# Patient Record
Sex: MALE | Race: BLACK OR AFRICAN AMERICAN
[De-identification: names, ages, dates, MRNs, and addresses within clinical notes are randomized per-mention and may not be internally consistent; named-entity substitution may affect disease eponyms.]

---

## 2018-10-29 ENCOUNTER — HOSPITAL ENCOUNTER (OUTPATIENT)
Dept: HOSPITAL 47 - RADUSWWP | Age: 33
End: 2018-10-29
Attending: FAMILY MEDICINE
Payer: MEDICARE

## 2018-10-29 DIAGNOSIS — M47.814: Primary | ICD-10-CM

## 2018-10-29 DIAGNOSIS — K76.0: ICD-10-CM

## 2018-10-29 DIAGNOSIS — M43.8X4: ICD-10-CM

## 2018-10-29 PROCEDURE — 76700 US EXAM ABDOM COMPLETE: CPT

## 2018-10-29 PROCEDURE — 72070 X-RAY EXAM THORAC SPINE 2VWS: CPT

## 2018-10-29 NOTE — XR
Thoracic spine

 

HISTORY: Pain

 

3 views of the thoracic spine

 

There is a mild spinal curvature. Thoracic vertebral bodies show preserved height and bone mineraliza
tion. Disc spaces are maintained. There is multilevel spondylosis.

 

IMPRESSION: Mild spinal curvature. Thoracic spondylosis.

## 2018-10-29 NOTE — XR
Chest x-ray with left RIBS

 

HISTORY: Chronic pain

 

Frontal view of the chest and 4 views of the left ribs submitted on a total 5 images and correlated p
rior chest x-ray 11/16/2015

 

Chest x-ray is stable. No acute cardiopulmonary disease. Bone mineralization is maintained. No eviden
t displaced rib fracture.

 

IMPRESSION: No significant abnormalities evident, bone scan may be of benefit as indicated
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
## 2022-04-12 ENCOUNTER — HOSPITAL ENCOUNTER (EMERGENCY)
Dept: HOSPITAL 47 - EC | Age: 37
Discharge: HOME | End: 2022-04-12
Payer: COMMERCIAL

## 2022-04-12 VITALS
RESPIRATION RATE: 18 BRPM | SYSTOLIC BLOOD PRESSURE: 134 MMHG | DIASTOLIC BLOOD PRESSURE: 70 MMHG | HEART RATE: 57 BPM | TEMPERATURE: 98 F

## 2022-04-12 DIAGNOSIS — M10.9: Primary | ICD-10-CM

## 2022-04-12 DIAGNOSIS — Z79.899: ICD-10-CM

## 2022-04-12 DIAGNOSIS — F17.200: ICD-10-CM

## 2022-04-12 DIAGNOSIS — I10: ICD-10-CM

## 2022-04-12 DIAGNOSIS — F12.90: ICD-10-CM

## 2022-04-12 DIAGNOSIS — K21.9: ICD-10-CM

## 2022-04-12 PROCEDURE — 99283 EMERGENCY DEPT VISIT LOW MDM: CPT
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
## 2023-07-29 ENCOUNTER — HOSPITAL ENCOUNTER (EMERGENCY)
Dept: HOSPITAL 47 - EC | Age: 38
Discharge: HOME | End: 2023-07-29
Payer: COMMERCIAL

## 2023-07-29 VITALS — TEMPERATURE: 98.6 F | HEART RATE: 56 BPM | SYSTOLIC BLOOD PRESSURE: 163 MMHG | DIASTOLIC BLOOD PRESSURE: 98 MMHG

## 2023-07-29 VITALS — RESPIRATION RATE: 18 BRPM

## 2023-07-29 DIAGNOSIS — K21.9: ICD-10-CM

## 2023-07-29 DIAGNOSIS — F17.200: ICD-10-CM

## 2023-07-29 DIAGNOSIS — I10: ICD-10-CM

## 2023-07-29 DIAGNOSIS — S02.601A: Primary | ICD-10-CM

## 2023-07-29 DIAGNOSIS — F12.90: ICD-10-CM

## 2023-07-29 DIAGNOSIS — Z79.899: ICD-10-CM

## 2023-07-29 DIAGNOSIS — S02.602A: ICD-10-CM
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
## 2023-07-29 PROCEDURE — 99406 BEHAV CHNG SMOKING 3-10 MIN: CPT

## 2023-07-29 PROCEDURE — 99284 EMERGENCY DEPT VISIT MOD MDM: CPT

## 2023-07-29 RX ADMIN — HYDROMORPHONE HYDROCHLORIDE STA: 1 INJECTION, SOLUTION INTRAMUSCULAR; INTRAVENOUS; SUBCUTANEOUS at 19:35

## 2023-07-29 RX ADMIN — HYDROMORPHONE HYDROCHLORIDE STA MG: 1 INJECTION, SOLUTION INTRAMUSCULAR; INTRAVENOUS; SUBCUTANEOUS at 19:35

## 2023-07-29 RX ADMIN — KETOROLAC TROMETHAMINE STA: 15 INJECTION, SOLUTION INTRAMUSCULAR; INTRAVENOUS at 19:45

## 2023-07-29 RX ADMIN — KETOROLAC TROMETHAMINE STA MG: 15 INJECTION, SOLUTION INTRAMUSCULAR; INTRAVENOUS at 19:34
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
## 2023-10-03 ENCOUNTER — HOSPITAL ENCOUNTER (EMERGENCY)
Dept: HOSPITAL 47 - EC | Age: 38
Discharge: HOME | End: 2023-10-03
Payer: COMMERCIAL

## 2023-10-03 VITALS
HEART RATE: 61 BPM | SYSTOLIC BLOOD PRESSURE: 140 MMHG | DIASTOLIC BLOOD PRESSURE: 88 MMHG | RESPIRATION RATE: 18 BRPM | TEMPERATURE: 98 F

## 2023-10-03 DIAGNOSIS — I10: ICD-10-CM

## 2023-10-03 DIAGNOSIS — F17.200: ICD-10-CM

## 2023-10-03 DIAGNOSIS — M10.9: Primary | ICD-10-CM

## 2023-10-03 PROCEDURE — 99283 EMERGENCY DEPT VISIT LOW MDM: CPT
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
## 2023-10-03 NOTE — XR
EXAMINATION TYPE: XR foot complete RT

 

DATE OF EXAM: 10/3/2023

 

CLINICAL HISTORY: First MTP joint pain

 

TECHNIQUE: Frontal, lateral, and oblique images of the right foot are obtained.

 

COMPARISON: None

 

FINDINGS:  There is no acute fracture/dislocation evident in the right foot. Moderate narrowing with 
mild spurring at the first metatarsophalangeal joint. Hallux valgus positioning is seen at this level
. Overlying soft tissues unremarkable.

 

IMPRESSION: As above.

## 2025-03-29 ENCOUNTER — HOSPITAL ENCOUNTER (EMERGENCY)
Dept: HOSPITAL 47 - EC | Age: 40
Discharge: HOME | End: 2025-03-29
Payer: COMMERCIAL

## 2025-03-29 VITALS
DIASTOLIC BLOOD PRESSURE: 80 MMHG | TEMPERATURE: 98 F | RESPIRATION RATE: 20 BRPM | HEART RATE: 66 BPM | SYSTOLIC BLOOD PRESSURE: 136 MMHG

## 2025-03-29 DIAGNOSIS — M10.9: Primary | ICD-10-CM

## 2025-03-29 DIAGNOSIS — F17.200: ICD-10-CM

## 2025-03-29 PROCEDURE — 99283 EMERGENCY DEPT VISIT LOW MDM: CPT

## 2025-03-29 RX ADMIN — COLCHICINE STA MG: 0.6 TABLET, FILM COATED ORAL at 18:07

## 2025-03-29 RX ADMIN — COLCHICINE STA MG: 0.6 TABLET, FILM COATED ORAL at 18:08

## 2025-03-29 RX ADMIN — IBUPROFEN STA MG: 800 TABLET, FILM COATED ORAL at 18:06
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
## 2025-04-09 ENCOUNTER — HOSPITAL ENCOUNTER (EMERGENCY)
Dept: HOSPITAL 47 - EC | Age: 40
Discharge: HOME | End: 2025-04-09
Payer: COMMERCIAL

## 2025-04-09 VITALS
RESPIRATION RATE: 20 BRPM | SYSTOLIC BLOOD PRESSURE: 146 MMHG | TEMPERATURE: 98 F | DIASTOLIC BLOOD PRESSURE: 96 MMHG | HEART RATE: 52 BPM

## 2025-04-09 DIAGNOSIS — F17.200: ICD-10-CM

## 2025-04-09 DIAGNOSIS — L02.01: Primary | ICD-10-CM

## 2025-04-09 PROCEDURE — 10060 I&D ABSCESS SIMPLE/SINGLE: CPT

## 2025-04-09 PROCEDURE — 99282 EMERGENCY DEPT VISIT SF MDM: CPT

## 2025-05-12 ENCOUNTER — HOSPITAL ENCOUNTER (EMERGENCY)
Dept: HOSPITAL 47 - EC | Age: 40
Discharge: HOME | End: 2025-05-12
Payer: MEDICARE

## 2025-05-12 VITALS — DIASTOLIC BLOOD PRESSURE: 83 MMHG | SYSTOLIC BLOOD PRESSURE: 150 MMHG | RESPIRATION RATE: 20 BRPM | HEART RATE: 71 BPM

## 2025-05-12 VITALS — TEMPERATURE: 102.9 F

## 2025-05-12 DIAGNOSIS — J02.0: Primary | ICD-10-CM

## 2025-05-12 DIAGNOSIS — F17.200: ICD-10-CM

## 2025-05-12 PROCEDURE — 99283 EMERGENCY DEPT VISIT LOW MDM: CPT

## 2025-05-12 PROCEDURE — 87651 STREP A DNA AMP PROBE: CPT

## 2025-05-12 PROCEDURE — 87636 SARSCOV2 & INF A&B AMP PRB: CPT

## 2025-05-12 RX ADMIN — IBUPROFEN STA MG: 400 TABLET, FILM COATED ORAL at 03:47

## 2025-05-12 RX ADMIN — ONDANSETRON STA MG: 4 TABLET, ORALLY DISINTEGRATING ORAL at 03:47

## 2025-05-12 RX ADMIN — AMOXICILLIN ONE MG: 875 TABLET, FILM COATED ORAL at 03:46

## 2025-05-12 RX ADMIN — ONDANSETRON STA EACH: 4 TABLET, ORALLY DISINTEGRATING ORAL at 03:57

## 2025-06-07 ENCOUNTER — HOSPITAL ENCOUNTER (EMERGENCY)
Dept: HOSPITAL 47 - EC | Age: 40
Discharge: HOME | End: 2025-06-07
Payer: MEDICARE

## 2025-06-07 VITALS — RESPIRATION RATE: 20 BRPM | TEMPERATURE: 97.9 F

## 2025-06-07 VITALS — HEART RATE: 97 BPM | SYSTOLIC BLOOD PRESSURE: 126 MMHG | DIASTOLIC BLOOD PRESSURE: 86 MMHG

## 2025-06-07 DIAGNOSIS — F17.200: ICD-10-CM

## 2025-06-07 DIAGNOSIS — L02.01: Primary | ICD-10-CM

## 2025-06-07 PROCEDURE — 76536 US EXAM OF HEAD AND NECK: CPT

## 2025-06-07 PROCEDURE — 99283 EMERGENCY DEPT VISIT LOW MDM: CPT

## 2025-06-07 RX ADMIN — AMOXICILLIN AND CLAVULANATE POTASSIUM STA EACH: 875; 125 TABLET, FILM COATED ORAL at 16:24
